# Patient Record
Sex: MALE | Race: AMERICAN INDIAN OR ALASKA NATIVE
[De-identification: names, ages, dates, MRNs, and addresses within clinical notes are randomized per-mention and may not be internally consistent; named-entity substitution may affect disease eponyms.]

---

## 2019-10-23 ENCOUNTER — HOSPITAL ENCOUNTER (EMERGENCY)
Dept: HOSPITAL 5 - ED | Age: 49
Discharge: HOME | End: 2019-10-23
Payer: COMMERCIAL

## 2019-10-23 VITALS — SYSTOLIC BLOOD PRESSURE: 134 MMHG | DIASTOLIC BLOOD PRESSURE: 81 MMHG

## 2019-10-23 DIAGNOSIS — R42: Primary | ICD-10-CM

## 2019-10-23 DIAGNOSIS — Z88.8: ICD-10-CM

## 2019-10-23 DIAGNOSIS — I10: ICD-10-CM

## 2019-10-23 LAB
BASOPHILS # (AUTO): 0.1 K/MM3 (ref 0–0.1)
BASOPHILS NFR BLD AUTO: 0.9 % (ref 0–1.8)
BUN SERPL-MCNC: 18 MG/DL (ref 9–20)
BUN/CREAT SERPL: 15 %
CALCIUM SERPL-MCNC: 8.9 MG/DL (ref 8.4–10.2)
EOSINOPHIL # BLD AUTO: 0.3 K/MM3 (ref 0–0.4)
EOSINOPHIL NFR BLD AUTO: 3.3 % (ref 0–4.3)
HCT VFR BLD CALC: 39 % (ref 35.5–45.6)
HEMOLYSIS INDEX: 4
HGB BLD-MCNC: 13.1 GM/DL (ref 11.8–15.2)
LYMPHOCYTES # BLD AUTO: 3.6 K/MM3 (ref 1.2–5.4)
LYMPHOCYTES NFR BLD AUTO: 46.1 % (ref 13.4–35)
MCHC RBC AUTO-ENTMCNC: 34 % (ref 32–34)
MCV RBC AUTO: 93 FL (ref 84–94)
MONOCYTES # (AUTO): 0.7 K/MM3 (ref 0–0.8)
MONOCYTES % (AUTO): 8.5 % (ref 0–7.3)
PLATELET # BLD: 355 K/MM3 (ref 140–440)
RBC # BLD AUTO: 4.21 M/MM3 (ref 3.65–5.03)

## 2019-10-23 PROCEDURE — 93010 ELECTROCARDIOGRAM REPORT: CPT

## 2019-10-23 PROCEDURE — 85025 COMPLETE CBC W/AUTO DIFF WBC: CPT

## 2019-10-23 PROCEDURE — 80048 BASIC METABOLIC PNL TOTAL CA: CPT

## 2019-10-23 PROCEDURE — 71045 X-RAY EXAM CHEST 1 VIEW: CPT

## 2019-10-23 PROCEDURE — 70450 CT HEAD/BRAIN W/O DYE: CPT

## 2019-10-23 PROCEDURE — 84484 ASSAY OF TROPONIN QUANT: CPT

## 2019-10-23 PROCEDURE — 93005 ELECTROCARDIOGRAM TRACING: CPT

## 2019-10-23 PROCEDURE — 36415 COLL VENOUS BLD VENIPUNCTURE: CPT

## 2019-10-23 NOTE — XRAY REPORT
CHEST 1 VIEW 



INDICATION / CLINICAL INFORMATION:

Chest Pain.



COMPARISON: 

None available.



FINDINGS:



SUPPORT DEVICES: None.



HEART / MEDIASTINUM: No significant abnormality. 



LUNGS / PLEURA: No significant pulmonary or pleural abnormality. No pneumothorax. 



ADDITIONAL FINDINGS: No significant additional findings.



IMPRESSION:

1. No acute findings.



Signer Name: Cayden Serrato MD 

Signed: 10/23/2019 4:50 AM

 Workstation Name: [id number]

## 2019-10-23 NOTE — CAT SCAN REPORT
CT HEAD WITHOUT CONTRAST



INDICATION / CLINICAL INFORMATION:  Dizziness, headache for 2 days.



TECHNIQUE: Axial imaging performed from the skull apex through the skull base without the use of cont
rast.  Sagittal and coronal reformatted images.  All CT scans at this location are performed using CT
 dose reduction for ALARA by means of automated exposure control. 



COMPARISON: None available.



FINDINGS:



CEREBRAL PARENCHYMA: No significant abnormality. No acute territorial infarct. 

HEMORRHAGE: None.

EXTRA-AXIAL SPACES: Normal in size and morphology for the patient's age.

VENTRICULAR SYSTEM: Normal in size and morphology for the patient's age.

MIDLINE SHIFT OR HERNIATION: None.



CEREBELLUM / BRAINSTEM: No significant abnormality.



CALVARIUM: No significant abnormality.

ORBITS: Normal as visualized.

PARANASAL SINUSES / MASTOID AIR CELLS: Normal as visualized.

SOFT TISSUES of HEAD: No significant abnormality.



ADDITIONAL FINDINGS: None.



IMPRESSION:

No acute intracranial abnormality.



Signer Name: Alcides Paez Jr, MD 

Signed: 10/23/2019 7:45 AM

 Workstation Name: [id number]

## 2019-10-23 NOTE — EMERGENCY DEPARTMENT REPORT
ED Dizziness HPI





- General


Chief Complaint: Dizziness


Stated Complaint: DIZZY HEADACHE


Time Seen by Provider: 10/23/19 10:27


Source: patient


Mode of arrival: Ambulatory


Limitations: No Limitations





- History of Present Illness


Initial Comments: 





Patient is 48 years old male with history of high blood pressure and obstructive

sleep apnea.  Patient sleep with CPAP.  Patient presented to the ER complaining 

of sudden onset of dizziness.  Patient stated that he was sleeping and he turned

to the other side when all of a sudden he starts to become dizzy and felt the 

room spinning.  Patient stated that symptoms improved with staying in one 

position.  Patient denied any similar condition before.  Patient denied any 

headache, neck pain, weakness numbness or tingling sensation.  Patient also 

denied any chest pain or shortness of breath.


MD Complaint: dizziness


-: This morning


Timing: sudden onset


Description: sense of movement, "room spinning"


History of Same: No


History of Trauma: No


Severity: moderate


Improves With: remaining still


Worsens With: position


Associated Symptoms: denies other symptoms





- Related Data


                                Home Medications











 Medication  Instructions  Recorded  Confirmed  Last Taken


 


Losartan/Hydrochlorothiazide 1 each PO QDAY 09/12/14 09/12/14 09/12/14 04:00





[Hyzaar 100-12.5 TAB]    











                                    Allergies











Allergy/AdvReac Type Severity Reaction Status Date / Time


 


pseudoephedrine Allergy  Unknown Verified 09/12/14 05:36














ED Review of Systems


ROS: 


Stated complaint: DIZZY HEADACHE


Other details as noted in HPI





Comment: All other systems reviewed and negative


Constitutional: denies: chills, fever


Respiratory: shortness of breath.  denies: cough, SOB with exertion, SOB at 

rest, wheezing


Cardiovascular: denies: chest pain, palpitations


Gastrointestinal: denies: abdominal pain, nausea, vomiting, diarrhea, 

constipation, hematemesis, melena, hematochezia


Musculoskeletal: denies: back pain


Neurological: vertigo.  denies: headache, weakness, numbness, paresthesias, 

confusion, abnormal gait





ED Past Medical Hx





- Past Medical History


Previous Medical History?: Yes


Hx Hypertension: Yes





- Surgical History


Past Surgical History?: No





- Social History


Smoking Status: Never Smoker


Substance Use Type: None





- Medications


Home Medications: 


                                Home Medications











 Medication  Instructions  Recorded  Confirmed  Last Taken  Type


 


Losartan/Hydrochlorothiazide 1 each PO QDAY 09/12/14 09/12/14 09/12/14 04:00 

History





[Hyzaar 100-12.5 TAB]     














ED Physical Exam





- General


Limitations: No Limitations


General appearance: alert, in no apparent distress





- Head


Head exam: Present: atraumatic, normocephalic, normal inspection





- Eye


Eye exam: Present: normal appearance





- ENT


ENT exam: Present: normal exam, normal orophraynx, mucous membranes moist





- Neck


Neck exam: Present: normal inspection, full ROM.  Absent: tenderness, 

meningismus, lymphadenopathy, thyromegaly





- Respiratory


Respiratory exam: Present: normal lung sounds bilaterally





- Cardiovascular


Cardiovascular Exam: Present: regular rate, normal rhythm, normal heart sounds





- GI/Abdominal


GI/Abdominal exam: Present: soft, normal bowel sounds.  Absent: distended, tende

rness, guarding, rebound, rigid, mass, bruit, pulsatile mass, hernia





ED Course





                                   Vital Signs











  10/23/19 10/23/19





  03:53 06:58


 


Temperature 97.4 F L 97.9 F


 


Pulse Rate 64 59 L


 


Respiratory 12 18





Rate  


 


Blood Pressure 132/68 


 


Blood Pressure  147/82





[Left]  


 


O2 Sat by Pulse 97 98





Oximetry  














ED Medical Decision Making





- Lab Data


Result diagrams: 


                                 10/23/19 04:07





                                 10/23/19 04:07





- EKG Data


-: EKG Interpreted by Me


EKG shows normal: sinus rhythm


Rate: normal





- EKG Data


Interpretation: no acute changes





- Radiology Data


Radiology results: report reviewed





- Medical Decision Making





Patient is 48 years old male with history of high blood pressure and obstructive

 sleep apnea.  Patient sleep with CPAP.  Patient presented to the ER complaining

 of sudden onset of dizziness.  Patient stated that he was sleeping and he 

turned to the other side when all of a sudden he starts to become dizzy and felt

 the room spinning.  Patient stated that symptoms improved with staying in one 

position.  Patient denied any similar condition before.  Patient denied any 

headache, neck pain, weakness numbness or tingling sensation.  Patient also 

denied any chest pain or shortness of breath.





EKG is normal.  A CT brain is negative for acute finding.  Chest x-ray is 

unremarkable.  Labs reviewed that is unremarkable.  Patient's symptoms is 

consistent with positional vertigo.  Patient given a prescription for meclizine 

and advised to follow-up with his primary care physician in the next 2-3 days 

and to attend to the ER if symptoms are not improved.


Critical care attestation.: 


If time is entered above; I have spent that time in minutes in the direct care 

of this critically ill patient, excluding procedure time.








ED Disposition


Clinical Impression: 


 Dizziness, Vertigo





Disposition: DC-01 TO HOME OR SELFCARE


Is pt being admited?: No


Condition: Stable


Instructions:  Dizziness (ED), Vertigo (ED)


Referrals: 


PRIMARY CARE,MD [Primary Care Provider] - 3-5 Days